# Patient Record
Sex: MALE | Race: WHITE | NOT HISPANIC OR LATINO | Employment: OTHER | URBAN - NONMETROPOLITAN AREA
[De-identification: names, ages, dates, MRNs, and addresses within clinical notes are randomized per-mention and may not be internally consistent; named-entity substitution may affect disease eponyms.]

---

## 2017-01-05 ENCOUNTER — TELEPHONE (OUTPATIENT)
Dept: NEUROSURGERY | Facility: CLINIC | Age: 82
End: 2017-01-05

## 2017-01-05 NOTE — TELEPHONE ENCOUNTER
I reviewed the last office note and this is what we had planned.  I will look at the x-ray when it get here, and plan for PRN follow ups.

## 2017-01-20 ENCOUNTER — DOCUMENTATION (OUTPATIENT)
Dept: NEUROSURGERY | Facility: CLINIC | Age: 82
End: 2017-01-20

## 2017-01-20 ENCOUNTER — TELEPHONE (OUTPATIENT)
Dept: NEUROSURGERY | Facility: CLINIC | Age: 82
End: 2017-01-20

## 2017-01-20 NOTE — TELEPHONE ENCOUNTER
Patient's daughter called regarding test results of XR cervical spine that was performed at [facility]. I have spoke with her today and have advised her of the test results (which look good), he isn't wearing the collar as much, only when he feels he needs it for comfort. They are agreeable to being seen as needed in our office and will contact us if needed.

## 2017-01-20 NOTE — PLAN OF CARE
Received Mr. Cates's follow up cervical x-ray on disk from [place] for review.  Accompanying report was included.  C1 fracture from this past October continues stable and patient continues to have no complaints of neck pain or associated issues.  Family has called for results and we will let them know the x-ray looks good and stable.  At this point, the patient can resume his usual activities and see us as needed with any new problems or concerns.